# Patient Record
Sex: FEMALE | Race: WHITE | Employment: FULL TIME | URBAN - METROPOLITAN AREA
[De-identification: names, ages, dates, MRNs, and addresses within clinical notes are randomized per-mention and may not be internally consistent; named-entity substitution may affect disease eponyms.]

---

## 2018-12-03 ENCOUNTER — OFFICE VISIT (OUTPATIENT)
Dept: INTERNAL MEDICINE CLINIC | Facility: CLINIC | Age: 43
End: 2018-12-03
Payer: COMMERCIAL

## 2018-12-03 VITALS
SYSTOLIC BLOOD PRESSURE: 112 MMHG | HEIGHT: 62 IN | WEIGHT: 118.19 LBS | DIASTOLIC BLOOD PRESSURE: 71 MMHG | BODY MASS INDEX: 21.75 KG/M2 | HEART RATE: 67 BPM

## 2018-12-03 DIAGNOSIS — R53.83 OTHER FATIGUE: ICD-10-CM

## 2018-12-03 DIAGNOSIS — N92.6 IRREGULAR MENSTRUATION: ICD-10-CM

## 2018-12-03 DIAGNOSIS — E03.8 OTHER SPECIFIED HYPOTHYROIDISM: Primary | ICD-10-CM

## 2018-12-03 DIAGNOSIS — Z85.850 HISTORY OF THYROID CANCER: ICD-10-CM

## 2018-12-03 DIAGNOSIS — E53.8 VITAMIN B12 DEFICIENCY: ICD-10-CM

## 2018-12-03 DIAGNOSIS — E61.1 IRON DEFICIENCY: ICD-10-CM

## 2018-12-03 PROCEDURE — 99204 OFFICE O/P NEW MOD 45 MIN: CPT | Performed by: INTERNAL MEDICINE

## 2018-12-03 PROCEDURE — 99212 OFFICE O/P EST SF 10 MIN: CPT | Performed by: INTERNAL MEDICINE

## 2018-12-04 PROBLEM — Z85.850 HISTORY OF THYROID CANCER: Status: ACTIVE | Noted: 2018-12-04

## 2018-12-04 PROBLEM — E89.0 POSTOPERATIVE HYPOTHYROIDISM: Status: ACTIVE | Noted: 2018-12-04

## 2018-12-04 NOTE — PROGRESS NOTES
HPI:    Patient ID: Tamanna Pac is a 44year old female.   Presents as a new patient with several concerns    HPI  Patient states that she has to be monitored because she has history of thyroid papillary carcinoma information from provided re person, place, and time. She appears well-developed and well-nourished. No distress. HENT:   Head: Normocephalic and atraumatic.    Mouth/Throat: Oropharynx is clear and moist.   Eyes: Conjunctivae and EOM are normal. Pupils are equal, round, and reactive

## 2018-12-18 ENCOUNTER — LAB ENCOUNTER (OUTPATIENT)
Dept: LAB | Facility: HOSPITAL | Age: 43
End: 2018-12-18
Attending: INTERNAL MEDICINE
Payer: COMMERCIAL

## 2018-12-18 DIAGNOSIS — E53.8 VITAMIN B12 DEFICIENCY: ICD-10-CM

## 2018-12-18 DIAGNOSIS — Z85.850 HISTORY OF THYROID CANCER: ICD-10-CM

## 2018-12-18 DIAGNOSIS — R53.83 OTHER FATIGUE: ICD-10-CM

## 2018-12-18 DIAGNOSIS — E61.1 IRON DEFICIENCY: ICD-10-CM

## 2018-12-18 DIAGNOSIS — E03.8 OTHER SPECIFIED HYPOTHYROIDISM: ICD-10-CM

## 2018-12-18 PROCEDURE — 36415 COLL VENOUS BLD VENIPUNCTURE: CPT

## 2018-12-18 PROCEDURE — 83540 ASSAY OF IRON: CPT

## 2018-12-18 PROCEDURE — 83550 IRON BINDING TEST: CPT

## 2018-12-18 PROCEDURE — 84443 ASSAY THYROID STIM HORMONE: CPT

## 2018-12-18 PROCEDURE — 80053 COMPREHEN METABOLIC PANEL: CPT

## 2018-12-18 PROCEDURE — 85025 COMPLETE CBC W/AUTO DIFF WBC: CPT

## 2018-12-18 PROCEDURE — 82607 VITAMIN B-12: CPT

## 2018-12-18 PROCEDURE — 82728 ASSAY OF FERRITIN: CPT

## 2018-12-18 PROCEDURE — 80061 LIPID PANEL: CPT

## 2018-12-18 PROCEDURE — 84432 ASSAY OF THYROGLOBULIN: CPT | Performed by: INTERNAL MEDICINE

## 2018-12-18 PROCEDURE — 86800 THYROGLOBULIN ANTIBODY: CPT | Performed by: INTERNAL MEDICINE

## 2018-12-24 ENCOUNTER — TELEPHONE (OUTPATIENT)
Dept: INTERNAL MEDICINE CLINIC | Facility: CLINIC | Age: 43
End: 2018-12-24

## 2018-12-24 DIAGNOSIS — E83.51 HYPOCALCEMIA: ICD-10-CM

## 2018-12-24 DIAGNOSIS — E89.0 POSTOPERATIVE HYPOTHYROIDISM: Primary | ICD-10-CM

## 2018-12-24 RX ORDER — LEVOTHYROXINE SODIUM 0.12 MG/1
125 TABLET ORAL
Qty: 90 TABLET | Refills: 0 | Status: SHIPPED | OUTPATIENT
Start: 2018-12-24 | End: 2019-05-29

## 2018-12-24 NOTE — TELEPHONE ENCOUNTER
Reviewed with patient recent blood test results, normal blood count, no anemia, normal iron level, normal kidney liver function test, slightly low calcium with history of thyroid ectomy advised to recheck BMP and phosphorus level to assure that she is not

## 2019-02-23 ENCOUNTER — TELEPHONE (OUTPATIENT)
Dept: INTERNAL MEDICINE CLINIC | Facility: CLINIC | Age: 44
End: 2019-02-23

## 2019-02-23 NOTE — TELEPHONE ENCOUNTER
Pt called in stating that she never received a call from the pharmacy to  medication and was not aware of where the script was sent. She states she is currently out of medication. She confirmed the pharmacy.      She states that if she needs to be co

## 2019-02-23 NOTE — TELEPHONE ENCOUNTER
Informed pt Rx sent to [facility] in [place]. Can go to the pharmacy and pick it up. Should stay on file for at least 1 year. Verbalized understanding.

## 2019-05-29 RX ORDER — LEVOTHYROXINE SODIUM 0.12 MG/1
125 TABLET ORAL
Qty: 90 TABLET | Refills: 1 | Status: SHIPPED | OUTPATIENT
Start: 2019-05-29 | End: 2019-11-26

## 2019-05-29 NOTE — TELEPHONE ENCOUNTER
Refill passed per [facility] [place] protocol.   Hypothyroid Medications  Protocol Criteria:  Appointment scheduled in the past 12 months or the next 3 months  TSH resulted in the past 12 months that is normal  Recent Outpatient Visits            5 months ago Other specified hypothyroidism    [address], Allison Figueroa MD    Office Visit          Lab Results   Component Value Date    TSH 1.420 12/18/2018

## 2019-05-29 NOTE — TELEPHONE ENCOUNTER
Pt called requesting refill for Levothyroxine Sodium 125 MCG Oral Tab      Pt states she is almost out , pharmacy states they have not been able to contact office for refill

## 2019-07-13 ENCOUNTER — HOSPITAL ENCOUNTER (OUTPATIENT)
Age: 44
Discharge: HOME | End: 2019-07-13
Attending: EMERGENCY MEDICINE
Payer: COMMERCIAL

## 2019-07-13 ENCOUNTER — NURSE TRIAGE (OUTPATIENT)
Dept: OTHER | Age: 44
End: 2019-07-13

## 2019-07-13 ENCOUNTER — APPOINTMENT (OUTPATIENT)
Dept: GENERAL RADIOLOGY | Age: 44
End: 2019-07-13
Attending: EMERGENCY MEDICINE
Payer: COMMERCIAL

## 2019-07-13 VITALS
TEMPERATURE: 98 F | SYSTOLIC BLOOD PRESSURE: 106 MMHG | OXYGEN SATURATION: 99 % | HEART RATE: 81 BPM | DIASTOLIC BLOOD PRESSURE: 72 MMHG | RESPIRATION RATE: 18 BRPM | HEIGHT: 62 IN | BODY MASS INDEX: 21.83 KG/M2 | WEIGHT: 118.63 LBS

## 2019-07-13 DIAGNOSIS — R05.8 PRODUCTIVE COUGH: Primary | ICD-10-CM

## 2019-07-13 DIAGNOSIS — R50.9 FEVER IN ADULT: ICD-10-CM

## 2019-07-13 PROCEDURE — 99204 OFFICE O/P NEW MOD 45 MIN: CPT

## 2019-07-13 PROCEDURE — 71046 X-RAY EXAM CHEST 2 VIEWS: CPT | Performed by: EMERGENCY MEDICINE

## 2019-07-13 PROCEDURE — 99213 OFFICE O/P EST LOW 20 MIN: CPT

## 2019-07-13 RX ORDER — AZITHROMYCIN 250 MG/1
TABLET, FILM COATED ORAL
Qty: 1 PACKAGE | Refills: 0 | Status: SHIPPED | OUTPATIENT
Start: 2019-07-13 | End: 2020-02-11

## 2019-07-13 NOTE — ED NOTES
Increase po fluids rest wash hands fever care meds as directed.  Call and follow up with pcp in office

## 2019-07-13 NOTE — ED PROVIDER NOTES
Patient Seen in: [facility] In [address] [place] [address]    History   Patient presents with:  Fever (infectious)    Stated Complaint: fever/chest congestion    HPI    Patient is a 50-year-old female with past history of thyroid disorder who presents clear and flat bilaterally.   There is no posterior pharyngeal erythema  Chest: Clear to auscultation, no tenderness  Cardiovascular: Regular rate and rhythm without murmur  Abdomen: Soft, nontender and nondistended  Neurologic: Patient is awake, alert and

## 2019-11-26 RX ORDER — LEVOTHYROXINE SODIUM 0.12 MG/1
125 TABLET ORAL
Qty: 90 TABLET | Refills: 1 | Status: SHIPPED | OUTPATIENT
Start: 2019-11-26 | End: 2020-06-06

## 2019-11-26 NOTE — TELEPHONE ENCOUNTER
Refill passed per [facility] protocol.   Hypothyroid Medications  Protocol Criteria:  Appointment scheduled in the past 12 months or the next 3 months  TSH resulted in the past 12 months that is normal  Recent Outpatient Visits            11 months ago

## 2020-02-11 ENCOUNTER — OFFICE VISIT (OUTPATIENT)
Dept: OBGYN CLINIC | Facility: CLINIC | Age: 45
End: 2020-02-11
Payer: COMMERCIAL

## 2020-02-11 VITALS
DIASTOLIC BLOOD PRESSURE: 72 MMHG | SYSTOLIC BLOOD PRESSURE: 108 MMHG | BODY MASS INDEX: 21.9 KG/M2 | HEIGHT: 62 IN | HEART RATE: 62 BPM | WEIGHT: 119 LBS

## 2020-02-11 DIAGNOSIS — N92.0 MENORRHAGIA WITH REGULAR CYCLE: ICD-10-CM

## 2020-02-11 DIAGNOSIS — Z12.31 SCREENING MAMMOGRAM, ENCOUNTER FOR: ICD-10-CM

## 2020-02-11 DIAGNOSIS — Z12.4 SCREENING FOR MALIGNANT NEOPLASM OF CERVIX: ICD-10-CM

## 2020-02-11 DIAGNOSIS — Z01.419 ENCOUNTER FOR GYNECOLOGICAL EXAMINATION WITHOUT ABNORMAL FINDING: Primary | ICD-10-CM

## 2020-02-11 PROCEDURE — 99386 PREV VISIT NEW AGE 40-64: CPT | Performed by: OBSTETRICS & GYNECOLOGY

## 2020-02-12 LAB — HPV I/H RISK 1 DNA SPEC QL NAA+PROBE: NEGATIVE

## 2020-02-14 PROBLEM — N92.0 MENORRHAGIA WITH REGULAR CYCLE: Status: ACTIVE | Noted: 2020-02-14

## 2020-02-14 NOTE — PROGRESS NOTES
HPI:    Patient ID: Gerhard Cunningham is a 38year old female. Sister of Nate Sharma. HPI   with menses q 28d / 7d / heavy on days 2-3.  In past 3 months, she has had a 2 week bleeding duration with 1 week spotting and 1 week mass. There is no tenderness. There is no rebound and no guarding. Genitourinary:    Vagina normal.   No breast discharge. There is no rash or lesion on the right labia. There is no rash or lesion on the left labia. Uterus is enlarged (8 week size).  Uter

## 2020-03-07 ENCOUNTER — HOSPITAL ENCOUNTER (OUTPATIENT)
Dept: MAMMOGRAPHY | Age: 45
Discharge: HOME | End: 2020-03-07
Attending: OBSTETRICS & GYNECOLOGY
Payer: COMMERCIAL

## 2020-03-07 ENCOUNTER — HOSPITAL ENCOUNTER (OUTPATIENT)
Dept: ULTRASOUND IMAGING | Age: 45
Discharge: HOME | End: 2020-03-07
Attending: OBSTETRICS & GYNECOLOGY
Payer: COMMERCIAL

## 2020-03-07 DIAGNOSIS — N92.0 MENORRHAGIA WITH REGULAR CYCLE: ICD-10-CM

## 2020-03-07 DIAGNOSIS — Z12.31 SCREENING MAMMOGRAM, ENCOUNTER FOR: ICD-10-CM

## 2020-03-07 PROCEDURE — 76856 US EXAM PELVIC COMPLETE: CPT | Performed by: OBSTETRICS & GYNECOLOGY

## 2020-03-07 PROCEDURE — 77063 BREAST TOMOSYNTHESIS BI: CPT | Performed by: OBSTETRICS & GYNECOLOGY

## 2020-03-07 PROCEDURE — 77067 SCR MAMMO BI INCL CAD: CPT | Performed by: OBSTETRICS & GYNECOLOGY

## 2020-03-07 PROCEDURE — 76830 TRANSVAGINAL US NON-OB: CPT | Performed by: OBSTETRICS & GYNECOLOGY

## 2020-06-06 RX ORDER — LEVOTHYROXINE SODIUM 0.12 MG/1
125 TABLET ORAL
Qty: 90 TABLET | Refills: 0 | Status: SHIPPED | OUTPATIENT
Start: 2020-06-06 | End: 2020-08-20

## 2020-06-06 NOTE — TELEPHONE ENCOUNTER
I refilled medication for 90 days, patient needs follow-up visit, last visit December 2018, please call patient

## 2020-08-15 ENCOUNTER — LAB ENCOUNTER (OUTPATIENT)
Dept: LAB | Age: 45
End: 2020-08-15
Attending: INTERNAL MEDICINE
Payer: COMMERCIAL

## 2020-08-15 ENCOUNTER — OFFICE VISIT (OUTPATIENT)
Dept: INTERNAL MEDICINE CLINIC | Facility: CLINIC | Age: 45
End: 2020-08-15
Payer: COMMERCIAL

## 2020-08-15 VITALS
SYSTOLIC BLOOD PRESSURE: 107 MMHG | RESPIRATION RATE: 18 BRPM | HEART RATE: 66 BPM | HEIGHT: 62 IN | DIASTOLIC BLOOD PRESSURE: 66 MMHG | BODY MASS INDEX: 22.82 KG/M2 | WEIGHT: 124 LBS

## 2020-08-15 DIAGNOSIS — E83.51 HYPOCALCEMIA: ICD-10-CM

## 2020-08-15 DIAGNOSIS — Z85.850 HISTORY OF THYROID CANCER: ICD-10-CM

## 2020-08-15 DIAGNOSIS — N76.1 SUBACUTE VAGINITIS: ICD-10-CM

## 2020-08-15 DIAGNOSIS — Z85.850 HISTORY OF THYROID CANCER: Primary | ICD-10-CM

## 2020-08-15 LAB
ALBUMIN SERPL-MCNC: 3.7 G/DL (ref 3.4–5)
ALBUMIN/GLOB SERPL: 0.9 {RATIO} (ref 1–2)
ALP LIVER SERPL-CCNC: 65 U/L (ref 37–98)
ALT SERPL-CCNC: 22 U/L (ref 13–56)
ANION GAP SERPL CALC-SCNC: 2 MMOL/L (ref 0–18)
AST SERPL-CCNC: 16 U/L (ref 15–37)
BASOPHILS # BLD AUTO: 0.05 X10(3) UL (ref 0–0.2)
BASOPHILS NFR BLD AUTO: 0.6 %
BILIRUB SERPL-MCNC: 0.3 MG/DL (ref 0.1–2)
BUN BLD-MCNC: 16 MG/DL (ref 7–18)
BUN/CREAT SERPL: 18.8 (ref 10–20)
CALCIUM BLD-MCNC: 8.3 MG/DL (ref 8.5–10.1)
CHLORIDE SERPL-SCNC: 112 MMOL/L (ref 98–112)
CO2 SERPL-SCNC: 26 MMOL/L (ref 21–32)
CREAT BLD-MCNC: 0.85 MG/DL (ref 0.55–1.02)
DEPRECATED RDW RBC AUTO: 39.4 FL (ref 35.1–46.3)
EOSINOPHIL # BLD AUTO: 0.18 X10(3) UL (ref 0–0.7)
EOSINOPHIL NFR BLD AUTO: 2.2 %
ERYTHROCYTE [DISTWIDTH] IN BLOOD BY AUTOMATED COUNT: 12.7 % (ref 11–15)
GLOBULIN PLAS-MCNC: 3.9 G/DL (ref 2.8–4.4)
GLUCOSE BLD-MCNC: 92 MG/DL (ref 70–99)
HCT VFR BLD AUTO: 39.9 % (ref 35–48)
HGB BLD-MCNC: 12.9 G/DL (ref 12–16)
IMM GRANULOCYTES # BLD AUTO: 0.01 X10(3) UL (ref 0–1)
IMM GRANULOCYTES NFR BLD: 0.1 %
LYMPHOCYTES # BLD AUTO: 1.73 X10(3) UL (ref 1–4)
LYMPHOCYTES NFR BLD AUTO: 21.3 %
M PROTEIN MFR SERPL ELPH: 7.6 G/DL (ref 6.4–8.2)
MCH RBC QN AUTO: 27.7 PG (ref 26–34)
MCHC RBC AUTO-ENTMCNC: 32.3 G/DL (ref 31–37)
MCV RBC AUTO: 85.6 FL (ref 80–100)
MONOCYTES # BLD AUTO: 0.75 X10(3) UL (ref 0.1–1)
MONOCYTES NFR BLD AUTO: 9.2 %
NEUTROPHILS # BLD AUTO: 5.41 X10 (3) UL (ref 1.5–7.7)
NEUTROPHILS # BLD AUTO: 5.41 X10(3) UL (ref 1.5–7.7)
NEUTROPHILS NFR BLD AUTO: 66.6 %
OSMOLALITY SERPL CALC.SUM OF ELEC: 291 MOSM/KG (ref 275–295)
PATIENT FASTING Y/N/NP: NO
PHOSPHATE SERPL-MCNC: 3.3 MG/DL (ref 2.5–4.9)
PLATELET # BLD AUTO: 251 10(3)UL (ref 150–450)
POTASSIUM SERPL-SCNC: 4.5 MMOL/L (ref 3.5–5.1)
PTH-INTACT SERPL-MCNC: 45.9 PG/ML (ref 18.5–88)
RBC # BLD AUTO: 4.66 X10(6)UL (ref 3.8–5.3)
SODIUM SERPL-SCNC: 140 MMOL/L (ref 136–145)
TSI SER-ACNC: 0.69 MIU/ML (ref 0.36–3.74)
WBC # BLD AUTO: 8.1 X10(3) UL (ref 4–11)

## 2020-08-15 PROCEDURE — 85025 COMPLETE CBC W/AUTO DIFF WBC: CPT

## 2020-08-15 PROCEDURE — 36415 COLL VENOUS BLD VENIPUNCTURE: CPT

## 2020-08-15 PROCEDURE — 84443 ASSAY THYROID STIM HORMONE: CPT

## 2020-08-15 PROCEDURE — 83970 ASSAY OF PARATHORMONE: CPT

## 2020-08-15 PROCEDURE — 86800 THYROGLOBULIN ANTIBODY: CPT | Performed by: INTERNAL MEDICINE

## 2020-08-15 PROCEDURE — 84100 ASSAY OF PHOSPHORUS: CPT

## 2020-08-15 PROCEDURE — 3008F BODY MASS INDEX DOCD: CPT | Performed by: INTERNAL MEDICINE

## 2020-08-15 PROCEDURE — 3078F DIAST BP <80 MM HG: CPT | Performed by: INTERNAL MEDICINE

## 2020-08-15 PROCEDURE — 84432 ASSAY OF THYROGLOBULIN: CPT | Performed by: INTERNAL MEDICINE

## 2020-08-15 PROCEDURE — 99213 OFFICE O/P EST LOW 20 MIN: CPT | Performed by: INTERNAL MEDICINE

## 2020-08-15 PROCEDURE — 80053 COMPREHEN METABOLIC PANEL: CPT

## 2020-08-15 PROCEDURE — 3074F SYST BP LT 130 MM HG: CPT | Performed by: INTERNAL MEDICINE

## 2020-08-15 RX ORDER — METRONIDAZOLE 7.5 MG/G
GEL VAGINAL
Qty: 1 TUBE | Refills: 0 | Status: SHIPPED | OUTPATIENT
Start: 2020-08-15 | End: 2021-10-18

## 2020-08-16 NOTE — PROGRESS NOTES
HPI:    Patient ID: Vitor Green is a 41year old female.   Presents for follow-up on hypothyroidism, concern of vaginal odor    HPI  Patient was seen in the office first time 2 years ago she moved here from [place] she was referred to see end well-nourished. No distress. HENT:   Head: Normocephalic and atraumatic. Eyes: Pupils are equal, round, and reactive to light. Conjunctivae and EOM are normal. No scleral icterus. Neck: Normal range of motion. Neck supple. No JVD present.    Thyroid i 1 Tube 0     Sig: Insert  1 aaplicatorfull intravaginally at bedtime for 5 days       Imaging & Referrals:  ENDOCRINOLOGY - INTERNAL         YB#[id number]

## 2020-08-18 ENCOUNTER — TELEPHONE (OUTPATIENT)
Dept: INTERNAL MEDICINE CLINIC | Facility: CLINIC | Age: 45
End: 2020-08-18

## 2020-08-18 LAB
THYROGLOBULIN ANTIBODY: <1.8 IU/ML
THYROGLOBULIN TUMOR MARKER: <0.1 NG/ML

## 2020-08-18 NOTE — TELEPHONE ENCOUNTER
Patient was in to see Dr. Fredrick Meier the other day and they was discussing a varicose vein doctor and she states Dr. Fredrick Meier forgot to give her the doctor name because she have ppo she don't need a referral       Please advise

## 2020-08-20 NOTE — TELEPHONE ENCOUNTER
Patient calling to follow up on name of  a \"varicose vein\" doctor that  recommends. Please advise.

## 2020-08-20 NOTE — TELEPHONE ENCOUNTER
Spoke to patient, reviewed her test results, negative thyroid cancer markers, TSH not suppressed enough, advised her to take levothyroxine 6 days a week 125 mcg and once a day , and once a week week take 1-1/2 pill recheck TSH level in 6 to 8 weeks, order

## 2021-08-16 ENCOUNTER — TELEPHONE (OUTPATIENT)
Dept: INTERNAL MEDICINE CLINIC | Facility: CLINIC | Age: 46
End: 2021-08-16

## 2021-08-16 NOTE — TELEPHONE ENCOUNTER
The patient is asking for her lab orders to be sent to her. She currently is living in [place] and needs them to be done in [place],    She has signed up to [employer] and will contact us back once she signs up so we can send the lab orders via [employer].

## 2021-08-27 ENCOUNTER — TELEPHONE (OUTPATIENT)
Dept: INTERNAL MEDICINE CLINIC | Facility: CLINIC | Age: 46
End: 2021-08-27

## 2021-08-27 DIAGNOSIS — E89.0 POSTOPERATIVE HYPOTHYROIDISM: Primary | ICD-10-CM

## 2021-08-27 RX ORDER — LEVOTHYROXINE SODIUM 0.12 MG/1
125 TABLET ORAL
Qty: 90 TABLET | Refills: 0 | Status: SHIPPED | OUTPATIENT
Start: 2021-08-27

## 2021-08-27 NOTE — TELEPHONE ENCOUNTER
Per patient she would like to know; What to do in order to update her medication Levothyroxine Sodium, patient did not do her blood work yet. Patient needs an Order.

## 2021-08-27 NOTE — TELEPHONE ENCOUNTER
From last TSH notes; please advise   Yessica Fragoso MD   8/17/2020  9:13 PM CDT       Please call patient that blood count is normal no anemia, normal sugar liver kidney function test, calcium is low I recommend that she takes 1 Tums twice a day at least t [facility], [address], Ranulfo Petersen DO    Office Visit    2 years ago Other specified hypothyroidism    Darrian Barros, MD Allison    Office Visit

## 2021-08-30 NOTE — TELEPHONE ENCOUNTER
I placed order for patient blood work BMP to follow-up on potassium and TSH level, she should make appointment for follow-up visit, last visit was 1 year ago

## 2021-08-31 NOTE — TELEPHONE ENCOUNTER
Left message for patient informing her that her labs have been ordered.  I have asked her to call back to confirm she received my message

## 2021-09-02 NOTE — TELEPHONE ENCOUNTER
Spoke to Patient.  She will call back to let us know when she will be back in [place] to have her Physical .

## 2021-09-10 ENCOUNTER — TELEPHONE (OUTPATIENT)
Dept: INTERNAL MEDICINE CLINIC | Facility: CLINIC | Age: 46
End: 2021-09-10

## 2021-09-10 NOTE — TELEPHONE ENCOUNTER
Cristela from lab in [place] calling to state pt is there to have labs drawn from Dr Verna Marques and they need orders faxed.     TSH and CMP orders faxed to [phone]

## 2021-10-18 ENCOUNTER — OFFICE VISIT (OUTPATIENT)
Dept: INTERNAL MEDICINE CLINIC | Facility: CLINIC | Age: 46
End: 2021-10-18
Payer: COMMERCIAL

## 2021-10-18 VITALS
DIASTOLIC BLOOD PRESSURE: 65 MMHG | SYSTOLIC BLOOD PRESSURE: 101 MMHG | HEIGHT: 62 IN | HEART RATE: 72 BPM | BODY MASS INDEX: 22.6 KG/M2 | WEIGHT: 122.81 LBS | RESPIRATION RATE: 16 BRPM

## 2021-10-18 DIAGNOSIS — Z00.00 PHYSICAL EXAM, ANNUAL: Primary | ICD-10-CM

## 2021-10-18 DIAGNOSIS — Z85.850 HISTORY OF THYROID CANCER: ICD-10-CM

## 2021-10-18 PROCEDURE — 3074F SYST BP LT 130 MM HG: CPT | Performed by: INTERNAL MEDICINE

## 2021-10-18 PROCEDURE — 3078F DIAST BP <80 MM HG: CPT | Performed by: INTERNAL MEDICINE

## 2021-10-18 PROCEDURE — 3008F BODY MASS INDEX DOCD: CPT | Performed by: INTERNAL MEDICINE

## 2021-10-18 PROCEDURE — 99396 PREV VISIT EST AGE 40-64: CPT | Performed by: INTERNAL MEDICINE

## 2021-10-18 RX ORDER — LEVOTHYROXINE SODIUM 112 UG/1
112 TABLET ORAL
Qty: 90 TABLET | Refills: 3 | Status: SHIPPED | OUTPATIENT
Start: 2021-10-18

## 2021-10-18 NOTE — PROGRESS NOTES
Subjective:   Patient ID: Kelly Corona is a 40year old female presents for physical exam    HPI  Overall patient has been feeling well, she has history of thyroid cancer removed and treated with radiation 10 years ago in [place]   Last 6 maryanne Left Ear: Tympanic membrane and ear canal normal.   Eyes:      General: No scleral icterus. Extraocular Movements: Extraocular movements intact. Conjunctiva/sclera: Conjunctivae normal.      Pupils: Pupils are equal, round, and reactive to light. patient to establish with a physician at the place of living especially endocrinology    Orders Placed This Encounter      CBC With Differential With Platelet      Comp Metabolic Panel (14)      Lipid Panel      Assay, Thyroid Stim Hormone      Thyroglobul

## 2021-10-20 NOTE — TELEPHONE ENCOUNTER
Pt calling to request labs for TSH entered that she will recheck in 2 months as advised. gyn resident

## (undated) NOTE — LETTER
01/17/22      [place]  [place] [id number]      Dear Shankar Hannon,    [facility] records indicate that you have outstanding lab work and or testing that was ordered for you and has not yet been completed:  Orders Placed T

## (undated) NOTE — LETTER
3/19/2020              [address] [place] [id number]         Dear Yana Rojas,      It was a pleasure to see you at our [place] , [address] [facility], [address] [place] [address] Drive office.  You have a normal mammogram.